# Patient Record
Sex: FEMALE | Race: AMERICAN INDIAN OR ALASKA NATIVE
[De-identification: names, ages, dates, MRNs, and addresses within clinical notes are randomized per-mention and may not be internally consistent; named-entity substitution may affect disease eponyms.]

---
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
## 2020-04-15 ENCOUNTER — HOSPITAL ENCOUNTER (EMERGENCY)
Dept: HOSPITAL 5 - ED | Age: 20
Discharge: HOME | End: 2020-04-15
Payer: MEDICAID

## 2020-04-15 VITALS — SYSTOLIC BLOOD PRESSURE: 120 MMHG | DIASTOLIC BLOOD PRESSURE: 69 MMHG

## 2020-04-15 DIAGNOSIS — Z79.899: ICD-10-CM

## 2020-04-15 DIAGNOSIS — F41.9: ICD-10-CM

## 2020-04-15 DIAGNOSIS — R07.89: Primary | ICD-10-CM

## 2020-04-15 DIAGNOSIS — Z98.890: ICD-10-CM

## 2020-04-15 LAB
BASOPHILS # (AUTO): 0 K/MM3 (ref 0–0.1)
BASOPHILS NFR BLD AUTO: 0.6 % (ref 0–1.8)
BUN SERPL-MCNC: 11 MG/DL (ref 7–17)
BUN/CREAT SERPL: 16 %
CALCIUM SERPL-MCNC: 9.6 MG/DL (ref 8.4–10.2)
EOSINOPHIL # BLD AUTO: 0.1 K/MM3 (ref 0–0.4)
EOSINOPHIL NFR BLD AUTO: 1.4 % (ref 0–4.3)
HCT VFR BLD CALC: 40 % (ref 30.3–42.9)
HEMOLYSIS INDEX: 25
HGB BLD-MCNC: 13.5 GM/DL (ref 10.1–14.3)
LYMPHOCYTES # BLD AUTO: 1.8 K/MM3 (ref 1.2–5.4)
LYMPHOCYTES NFR BLD AUTO: 43.8 % (ref 13.4–35)
MCHC RBC AUTO-ENTMCNC: 34 % (ref 30–34)
MCV RBC AUTO: 87 FL (ref 79–97)
MONOCYTES # (AUTO): 0.2 K/MM3 (ref 0–0.8)
MONOCYTES % (AUTO): 5.4 % (ref 0–7.3)
PLATELET # BLD: 290 K/MM3 (ref 140–440)
RBC # BLD AUTO: 4.59 M/MM3 (ref 3.65–5.03)

## 2020-04-15 PROCEDURE — 36415 COLL VENOUS BLD VENIPUNCTURE: CPT

## 2020-04-15 PROCEDURE — 84703 CHORIONIC GONADOTROPIN ASSAY: CPT

## 2020-04-15 PROCEDURE — 85025 COMPLETE CBC W/AUTO DIFF WBC: CPT

## 2020-04-15 PROCEDURE — 80048 BASIC METABOLIC PNL TOTAL CA: CPT

## 2020-04-15 PROCEDURE — 84484 ASSAY OF TROPONIN QUANT: CPT

## 2020-04-15 PROCEDURE — 71046 X-RAY EXAM CHEST 2 VIEWS: CPT

## 2020-04-15 NOTE — XRAY REPORT
CHEST 2 VIEWS 



INDICATION: 

cp.



COMPARISON: 

None.



FINDINGS:

Support devices: None.



Heart: Within normal limits. 

Pulmonary vasculature: Normal.

Lungs/pleura: No acute air space or interstitial disease.  No pneumothorax.



Additional findings: None.



IMPRESSION:

1. No acute findings.



Signer Name: Rony Laboy MD 

Signed: 4/15/2020 2:44 PM

Workstation Name: [id number]

## 2020-04-15 NOTE — EMERGENCY DEPARTMENT REPORT
ED Chest Pain HPI





- General


Chief Complaint: Anxiety


Stated Complaint: TIGHTNESS IN CHEST/JULIANE


Time Seen by Provider: 04/15/20 12:47


Source: patient


Mode of arrival: Ambulatory


Limitations: No Limitations





- History of Present Illness


Initial Comments: 





This is a 19-year-old female nontoxic, well nourished in appearance, no acute 

signs of distress presents to the ED with c/o of intermittent chest pain and SOB

x2 days.  Patient denies any radiation of pain. Currtnly denies any SOB.  Stated

she beleives she has anxiety and symptoms worsen during stressful events.  

Patient describes pain as aching intermittently.  Patient denies any upper 

respiratory symptoms.  Patient denies any shortness of breath, hemoptysis, 

fever, chills, nausea, vomiting, headache, stiff neck, numbness, tingling, 

abdominal pain.  Patient denies pleuritic chest pain.  Patient denies any recent

travels or long car rides.  Patient denies any recent surgeries or any sick 

contacts.  Patient denies any drug allergies or PMH.


MD Complaint: chest pain


-: days(s)


Pain Radiation: none


Severity: mild


Consistency: intermittent


Improves With: rest


Worsens With: other (stress)


re: denies: nausea, vomting, diaphoresis, dyspnea, sense of impending doom


Other Symptoms: denies: cough, fever, syncope, rash, acid taste in mouth, leg 

swelling, palpitations, burping


Treatments Prior to Arrival: none


Aspirin use within the Past 7 Days: (0) No





- Related Data


On Oral Contraceptives: No


                                  Previous Rx's











 Medication  Instructions  Recorded  Last Taken  Type


 


Diphenhydramine HCl [Benadryl 25 mg PO Q8H #30 tablet 04/09/16 Unknown Rx





Allergy TAB]    


 


Erythromycin [Erythromycin Ophth 10 applic OU BID #2 tube 04/09/16 Unknown Rx





Oint]    


 


Cetirizine HCl [ZyrTEC] 10 mg PO DAILY #30 capsule 03/29/17 Unknown Rx


 


predniSONE [Deltasone] 20 mg PO QDAY #5 tab 03/29/17 Unknown Rx


 


Naproxen 500 mg PO Q12H PRN #15 tablet 04/15/20 Unknown Rx











                                    Allergies











Allergy/AdvReac Type Severity Reaction Status Date / Time


 


No Known Allergies Allergy   Unverified 04/09/16 13:19














Heart Score





- HEART Score


History: Slightly suspicious


EKG: Normal


Age: < 45


Risk factors: No known risk factors


Troponin: < normal limit


HEART Score: 0





ED Review of Systems


ROS: 


Stated complaint: TIGHTNESS IN CHEST/JULIANE


Other details as noted in HPI





Constitutional: denies: chills, fever


Eyes: denies: eye pain, eye discharge, vision change


ENT: denies: ear pain, throat pain


Respiratory: denies: cough, shortness of breath, wheezing


Cardiovascular: chest pain.  denies: palpitations


Endocrine: no symptoms reported


Gastrointestinal: denies: abdominal pain, nausea, diarrhea


Genitourinary: denies: urgency, dysuria, discharge


Musculoskeletal: denies: back pain, joint swelling, arthralgia


Skin: denies: rash, lesions


Neurological: denies: headache, weakness, paresthesias


Psychiatric: denies: anxiety, depression


Hematological/Lymphatic: denies: easy bleeding, easy bruising





ED Past Medical Hx





- Past Medical History


Previous Medical History?: No


Hx Psychiatric Treatment: Yes (ANXIETY)





- Surgical History


Past Surgical History?: Yes


Additional Surgical History: Thyroidectomy 2016





- Social History


Smoking Status: Never Smoker


Substance Use Type: None





- Medications


Home Medications: 


                                Home Medications











 Medication  Instructions  Recorded  Confirmed  Last Taken  Type


 


Diphenhydramine HCl [Benadryl 25 mg PO Q8H #30 tablet 04/09/16  Unknown Rx





Allergy TAB]     


 


Erythromycin [Erythromycin Ophth 10 applic OU BID #2 tube 04/09/16  Unknown Rx





Oint]     


 


Cetirizine HCl [ZyrTEC] 10 mg PO DAILY #30 capsule 03/29/17  Unknown Rx


 


predniSONE [Deltasone] 20 mg PO QDAY #5 tab 03/29/17  Unknown Rx


 


Naproxen 500 mg PO Q12H PRN #15 tablet 04/15/20  Unknown Rx














ED Physical Exam





- General


Limitations: No Limitations


General appearance: alert, in no apparent distress





- Head


Head exam: Present: atraumatic, normocephalic





- Eye


Eye exam: Present: normal appearance





- Neck


Neck exam: Present: normal inspection, full ROM.  Absent: tenderness, 

meningismus, lymphadenopathy





- Respiratory


Respiratory exam: Present: normal lung sounds bilaterally.  Absent: respiratory 

distress, wheezes, rales, rhonchi, stridor, chest wall tenderness, accessory 

muscle use, decreased breath sounds, prolonged expiratory





- Cardiovascular


Cardiovascular Exam: Present: regular rate, normal rhythm, normal heart sounds. 

 Absent: bradycardia, tachycardia, irregular rhythm, systolic murmur, diastolic 

murmur, rubs, gallop





- GI/Abdominal


GI/Abdominal exam: Present: soft, normal bowel sounds.  Absent: distended, 

tenderness, guarding, rebound, rigid, diminished bowel sounds





- Extremities Exam


Extremities exam: Present: normal inspection, full ROM, normal capillary refill.

  Absent: tenderness, joint swelling





- Back Exam


Back exam: Present: normal inspection, full ROM.  Absent: tenderness, CVA 

tenderness (R), CVA tenderness (L), muscle spasm, paraspinal tenderness, 

vertebral tenderness, rash noted





- Neurological Exam


Neurological exam: Present: alert, oriented X3, normal gait





- Psychiatric


Psychiatric exam: Present: normal affect, normal mood





- Skin


Skin exam: Present: warm, dry, intact, normal color.  Absent: rash





ED Course


                                   Vital Signs











  04/15/20





  10:17


 


Temperature 98.3 F


 


Pulse Rate 79


 


Respiratory 18





Rate 


 


Blood Pressure 132/72


 


O2 Sat by Pulse 100





Oximetry 














- Reevaluation(s)


Reevaluation #1: 





04/15/20 14:04


Patient is speaking in full sentences with no signs of distress noted.





JR score





- Jr Score


Age > 65: (0) No


Aspirin use within the Past 7 Days: (0) No


3 or more CAD Risk Factors: (0) No


2 or more Angina events in past 24 hrs: (0) No


Known CAD with more than 50% Stenosis: (0) No


Elevated Cardiac Markers: (0) No


ST Deviation Greater than 0.5mm: (0) No


JR Score: 0





ED Medical Decision Making





- Lab Data


Result diagrams: 


                                 04/15/20 13:12





                                 04/15/20 13:12





- Medical Decision Making





This is a 19-year-old female that presents with atypical chest pain.  Patient is

 stable and was examined by me.  JR and HEART score 0 pints. Wells criteria 

for DVT/SVT/PE 0 points.  EKG normal sinus rhythm with no significant changes in

 ST.  Chest xray dictated by the radiologist. PAtient is notified of the Xray 

report with no questions noted.  Labs within normal limits. Negative troponin.  

Patient received Motrin in the ED which she stated his symptoms are improving 

subsided.  I will discharge patient with Naproxen.  Patient was instructed to 

Follow-up with a primary care/cardiologist doctor in 2 days or if symptoms 

worsen and continue return to emergency room as soon as possible.  At time of 

discharge, the patient does not seem toxic or ill in appearance.  No acute signs

 of distress noted.  Patient agrees to discharge treatment plan of care.  No 

further questions noted by the patient.


Critical care attestation.: 


If time is entered above; I have spent that time in minutes in the direct care 

of this critically ill patient, excluding procedure time.








ED Disposition


Clinical Impression: 


 Atypical chest pain





Disposition: DC-01 TO HOME OR SELFCARE


Is pt being admited?: No


Does the pt Need Aspirin: No


Condition: Stable


Instructions:  Chest Pain (ED)


Additional Instructions: 


Follow-up with a primary care/cardiologist doctor in 2 days or if symptoms 

worsen and continue return to emergency room as soon as possible.  


Prescriptions: 


Naproxen 500 mg PO Q12H PRN #15 tablet


 PRN Reason: Pain , Severe (7-10)


Referrals: 


PRIMARY CARE,MD [Primary Care Provider] - 3-5 Days


JULIETA SIFUENTES MD [Staff Physician] - 3-5 Days


MACO OWUSU MD [Staff Physician] - 04/17/20


Forms:  Work/School Release Form(ED)